# Patient Record
(demographics unavailable — no encounter records)

---

## 2023-01-01 NOTE — XR
EXAMINATION TYPE: XR hand limited bilateral

 

DATE OF EXAM: 2023

 

CLINICAL HISTORY: Abnormal physical exam on birth.

 

TECHNIQUE: Frontal and lateral images of the bilateral hands are obtained.

 

COMPARISON: None.

 

FINDINGS: Exam suboptimal due to patient age with difficulty positioning. There is flexion in the [place]
ateral hands making evaluation suboptimal. There are 5 metatarsals and proximal phalanxes identified 
bilaterally. No ossified sixth digit clearly seen. Soft tissue prominence adjacent to the distal fift
h finger correlates with patient's history without bony density at this level.

 

IMPRESSION: As above.

## 2023-01-01 NOTE — P.HPPD
History of Present Illness


H&P Date: 23


Baby Phong Ryan is a  infant born to a 23 yo  mother at 39.1 weeks 

gestation via . Antepartum complications include elevated BPs at 

160/100s upon admission, diagnosed with pre-eclampsia.


Maternal serologies: blood type O+, antibody neg, rubella immune, HepB neg, GBS 

neg, HIV neg, RPR nonreactive. GC neg, Ct neg. Infant blood type O+, RISSA neg.





Delivery:


GA: 39.1 weeks


Birth Date: 23


Birth Time: 


BW: 3090g


Length: 19 in


HC: 13 in


Fluid: clear


Apgar: 9, 9


3 vessel cord





No delivery complications.





Medications and Allergies


                                    Allergies











Allergy/AdvReac Type Severity Reaction Status Date / Time


 


No Known Allergies Allergy   Verified 23 20:44














Exam


                                   Vital Signs











  Temp Pulse Resp


 


 23 08:00  98.5 F  138  40


 


 23 05:58  98.4 F  133  38


 


 23 01:58  98.3 F  124 L  38


 


 23 21:49  98.4 F  130  36


 


 23 21:28  98.4 F  128 L  36


 


 23 20:58  98.2 F  134  40


 


 23 20:28  98.0 F  130  45


 


 23 19:58  98.5 F   50








                                Intake and Output











 23





 22:59 06:59 14:59


 


Intake Total 20 40 


 


Balance 20 40 


 


Intake:   


 


  Oral 20 40 


 


    Feeding Type 1 20 40 


 


Other:   


 


  # Voids  2 


 


  # Bowel Movements  1 


 


  Weight 3.09 kg  











General: sleeping comfortably, well appearing, in no acute distress


Head: normocephalic, anterior fontanelle soft and flat


Eyes: no discharge, + red reflex


Ears: normal pinna


Nose: patent nares


Mouth: no ulcers or lesions


Neck: good ROM, no lymphadenopathy


CV: regular rate and rhythm, no murmurs, cap refill < 2 sec


Resp: no increased work of breathing, good aeration, no retractions


Abd: soft, nondistended, + bowel sounds


G/U: B/L descended testicles


M/S: B/L polydactyly 5th digit, R digit with small nailbed


Skin: no rashes, no cyanosis


Neuro: good tone, no focal deficits





Assessment and Plan


(1) Single liveborn, born in hospital, delivered by  section


Current Visit: Yes   Status: Acute   Code(s): Z38.01 - SINGLE LIVEBORN INFANT, 

DELIVERED BY    SNOMED Code(s): 007167537


   





(2) Infant fed formula


Current Visit: Yes   Status: Acute   Code(s): ZRM6841 -    SNOMED Code(s): 

79034107


   





(3)  affected by maternal pre-eclampsia


Current Visit: Yes   Status: Acute   Code(s): P00.0 -  AFFECTED BY 

MATERNAL HYPERTENSIVE DISORDERS   SNOMED Code(s): 0265603532


   





(4) Polydactyly of both hands


Current Visit: Yes   Status: Acute   Code(s): Q69.9 - POLYDACTYLY, UNSPECIFIED  

SNOMED Code(s): 28942491


   


Plan: 


-Routine  care


-B/L hand xray

## 2023-01-01 NOTE — P.PN
Subjective


Progress Note Date: 23


Principal diagnosis: 


Delivery was 39.1 weeks gestation via , supernumerary digits ligated 


Primary is Pat 


Mother's name is Jil


The infant's name is Braden


Not Breastfeeding 








H&P Date: 23


Magno Ryan is a  infant born to a 21 yo  mother at 39.1 weeks 

gestation via . Antepartum complications include elevated BPs at 

160/100s upon admission, diagnosed with pre-eclampsia.


Maternal serologies: blood type O+, antibody neg, rubella immune, HepB neg, GBS 

neg, HIV neg, RPR nonreactive. GC neg, Ct neg. Infant blood type O+, RISSA neg.





Delivery:


GA: 39.1 weeks


Birth Date: 23


Birth Time: 


BW: 3090g


Length: 19 in


HC: 13 in


Fluid: clear


Apgar: 9, 9


3 vessel cord





No delivery complications.





Progress Note Date: 23


No acute events overnight. Feeding well, is voiding and stooling. Mother with no

infant concerns at this time. TcBili 6.1 at 24 HOL.





B/L hand xrays yesterday showed no bony prominence on B/L polydactyly. Ligated 

extra digits on B/L 5th digits with vicryl suture thread, infant tolerated 

procedure well.





Delivery was 39.1 weeks gestation via , supernumerary digits ligated 


Primary is Pat 


Mother's name is Jil


The infant's name is Braden


Not Breastfeeding 








Hospital Course





1) Resp/CV


No significant issues at present





2) Fluids/Nutrition


NOT Breastfeeding


Birthweight  3090 g (AGA), current weight 2.99 kg - late , (3.2 % negative 

weight change). 





3) 39.1 weeks gestation via , supernumerary digits ligated 


No glucose or temp instability was documented


The TcBili 9.3 @ 53 hours 


The TcBili 11.5 @ 76 hours 





4) ID


Not a current cause for concern





5) MSK


 supernumerary digits ligated 





5) Psychosocial/Disposition


Family updated at the bedside.


 - Social Work consult


Admit prolonged due to maternal factors only








Vitamin K and HBV was administered.





The f/u hearing screen passed





The CCHD passed 














Objective





- Vital Signs


Vital signs: 


                                   Vital Signs











Temp  98.7 F   23 00:00


 


Pulse  130   23 00:00


 


Resp  30   23 00:00


 


BP      


 


Pulse Ox      


 


FiO2      








                                 Intake & Output











 23





 18:59 06:59 18:59


 


Intake Total 20 170 


 


Balance 20 170 


 


Weight  2.895 kg 


 


Intake:   


 


  Oral 20 170 


 


    Feeding Type 1 20 170 


 


Other:   


 


  # Voids 1 1 


 


  # Bowel Movements 1 1 














- Exam





[place] flat, acyanotic, calvarium intact and symmetrical.





The tragus is normally formed and placed





Nares patent bilaterally





Oropharynx with palate fused midline, no significant ankylosis of lip or tongue,

no bonds nodules or Clifford's Pearls





Neck without clavicle fractures evident, thyroid masses or branchial cleft 

remnant.





Chest clear to auscultation with full expansion of the chest cavity





Cardiac S1-S2 normally split without any obvious murmurs or gallops. Distal 

pulses +2/+2





Abdomen bowel sounds present without evident distension, masses or tenderness





 rectal: External genitalia anatomy normal/not reexamined if modified by 

another provider, patent non inflamed rectum





Back and extremities without developmental hip dysplasia, full active and 

passive range of motion, no significant crepitus


Supernumerary digits with ties bilaterally





Skin without clubbing cyanosis or edema. Good Capillary refill.





Neuro no pathologic reflexes were identified








Assessment and Plan


(1) Single liveborn, born in hospital, delivered by  section


Current Visit: Yes   Status: Acute   Code(s): Z38.01 - SINGLE LIVEBORN INFANT, 

DELIVERED BY    SNOMED Code(s): 308770892


   





(2) Infant fed formula


Current Visit: Yes   Status: Acute   Code(s): UZM3057 -    SNOMED Code(s): 

31927639


   





(3) Supernumerary digits


Current Visit: Yes   Status: Acute   Code(s): Q69.9 - POLYDACTYLY, UNSPECIFIED  

SNOMED Code(s): 742582076


   





(4)  affected by maternal pre-eclampsia


Current Visit: Yes   Status: Acute   Code(s): P00.0 -  AFFECTED BY 

MATERNAL HYPERTENSIVE DISORDERS   SNOMED Code(s): 7264908753


   





(5) Needs parenting support and education


Narrative/Plan: 


social work consult


Current Visit: Yes   Status: Acute   Code(s): Z78.9 - OTHER SPECIFIED HEALTH 

STATUS   SNOMED Code(s): 676968088


   


Plan: 


As noted above





1) Anticipatory guidance discussed re: first three months of life as time 

permitted





2) Breastfeeding was encouraged if the family was receptive





3) Family encouraged to schedule a f/u visit with their primary care 

pediatrician prior to discharge





Time with Patient: Greater than 30

## 2023-01-01 NOTE — P.PN
Subjective


Progress Note Date: 23


No acute events overnight. Feeding well, is voiding and stooling. Mother with no

infant concerns at this time. TcBili 6.1 at 24 HOL.





B/L hand xrays yesterday showed no bony prominence on B/L polydactyly. Ligated 

extra digits on B/L 5th digits with vicryl suture thread, infant tolerated 

procedure well.





Objective





- Vital Signs


Vital signs: 


                                   Vital Signs











Temp  98.4 F   23 08:39


 


Pulse  160   23 08:39


 


Resp  58   23 08:39


 


BP      


 


Pulse Ox      


 


FiO2      








                                 Intake & Output











 23





 18:59 06:59 18:59


 


Intake Total 100 150 60


 


Balance 100 150 60


 


Weight  3 kg 


 


Intake:   


 


  Oral 100 150 60


 


    Feeding Type 1 100 150 60


 


Other:   


 


  # Voids 1  


 


  # Bowel Movements 1  














- Exam


General: sleeping comfortably, well appearing, in no acute distress


Head: normocephalic, anterior fontanelle soft and flat


Mouth: no ulcers or lesions


Neck: good ROM, no lymphadenopathy


CV: regular rate and rhythm, no murmurs, cap refill < 2 sec


Resp: no increased work of breathing, good aeration, no retractions


Abd: soft, nondistended, + bowel sounds


G/U: B/L descended testicles


M/S: B/L polydactyly 5th digit, R digit with small nailbed


Skin: no rashes, no cyanosis


Neuro: good tone, no focal deficits





Assessment and Plan


(1) Single liveborn, born in hospital, delivered by  section


Current Visit: Yes   Status: Acute   Code(s): Z38.01 - SINGLE LIVEBORN INFANT, 

DELIVERED BY    SNOMED Code(s): 050422053


   





(2) Infant fed formula


Current Visit: Yes   Status: Acute   Code(s): FAM2152 -    SNOMED Code(s): 

60215526


   





(3) [place] affected by maternal pre-eclampsia


Current Visit: Yes   Status: Acute   Code(s): P00.0 -  AFFECTED BY 

MATERNAL HYPERTENSIVE DISORDERS   SNOMED Code(s): 5458643444


   





(4) Polydactyly of both hands


Current Visit: Yes   Status: Acute   Code(s): Q69.9 - POLYDACTYLY, UNSPECIFIED  

SNOMED Code(s): 07626383


   


Plan: 


-Routine  care

## 2023-01-01 NOTE — P.DS
Providers


Date of admission: 


23 19:58





Attending physician: 


Arash Nash MD





Primary care physician: 











Delivery was 39.1 weeks gestation via , supernumerary digits ligated 


Primary is Pat 


Mother's name is Jil


The infant's name is Braden


Not Breastfeeding 











- Discharge Diagnosis(es)


(1) Supernumerary digits


Current Visit: Yes   Status: Acute   





(2) Infant fed formula


Current Visit: Yes   Status: Acute   





(3)  affected by maternal pre-eclampsia


Current Visit: Yes   Status: Acute   





(4) Single liveborn, born in hospital, delivered by  section


Current Visit: Yes   Status: Acute   


Hospital Course: 


H&P Date: 23


Magno Ryan is a  infant born to a 23 yo  mother at 39.1 weeks 

gestation via . Antepartum complications include elevated BPs at 

160/100s upon admission, diagnosed with pre-eclampsia.


Maternal serologies: blood type O+, antibody neg, rubella immune, HepB neg, GBS 

neg, HIV neg, RPR nonreactive. GC neg, Ct neg. Infant blood type O+, RISSA neg.





Delivery:


GA: 39.1 weeks


Birth Date: 23


Birth Time: 


BW: 3090g


Length: 19 in


HC: 13 in


Fluid: clear


Apgar: 9, 9


3 vessel cord





No delivery complications.





Progress Note Date: 23


No acute events overnight. Feeding well, is voiding and stooling. Mother with no

infant concerns at this time. TcBili 6.1 at 24 HOL.





B/L hand xrays yesterday showed no bony prominence on B/L polydactyly. Ligated 

extra digits on B/L 5th digits with vicryl suture thread, infant tolerated proce

dure well.





Delivery was 39.1 weeks gestation via , supernumerary digits ligated 


Primary is Pat 


Mother's name is Jil


The infant's name is Braden


Not Breastfeeding 








Hospital Course





1) Resp/CV


No significant issues at present





2) Fluids/Nutrition


NOT Breastfeeding


Birthweight  3090 g (AGA), current weight 2.99 kg - late , (3.2 % negative 

weight change). 





3) 39.1 weeks gestation via , supernumerary digits ligated 


No glucose or temp instability was documented


The TcBili 9.3 @ 53 hours - will f/u prior to discharge 





4) ID


Not a current cause for concern





5) MSK


 supernumerary digits ligated 





5) Psychosocial/Disposition


Family updated at the bedside.








Vitamin K and HBV was administered.





The f/u hearing screen passed





The CCHD passed 








Discharge Exam





[place] flat, acyanotic, calvarium intact and symmetrical.





The tragus is normally formed and placed





Nares patent bilaterally





Oropharynx with palate fused midline, no significant ankylosis of lip or tongue,

no bonds nodules or Clifford's Pearls





Neck without clavicle fractures evident, thyroid masses or branchial cleft 

remnant.





Chest clear to auscultation with full expansion of the chest cavity





Cardiac S1-S2 normally split without any obvious murmurs or gallops. Distal 

pulses +2/+2





Abdomen bowel sounds present without evident distension, masses or tenderness





 rectal: External genitalia anatomy normal/not reexamined if modified by 

another provider, patent non inflamed rectum





Back and extremities without developmental hip dysplasia, full active and 

passive range of motion, no significant crepitus


Supernumerary digits with ties bilaterally





Skin without clubbing cyanosis or edema. Good Capillary refill.





Neuro no pathologic reflexes were identified











Patient Condition at Discharge: Good





Plan - Discharge Summary


Follow up Appointment(s)/Referral(s): 


Pattie Stewart MD [STAFF PHYSICIAN] - 1 Week


Activity/Diet/Wound Care/Special Instructions: 


Over the next several days, the extra digits will start turning a blue/black 

color and eventually fall off the hands. Keep the hands covered at all times so 

that once extra digits fall off, infant does not swallow them.











Anticipatory Guidance re: newborns


The following is general advice and guidance about issues that only COULD 

develop in the first few months of life - there is of course significant va

riability from one infant to another





Vision:


Initial vision is limited to shapes, lights and dark for the first few days


Initial color vision is primarily red and yellow - it is an exciting time as y

our infant will suddenly recognize new colors suddenly


Initial toys should have bright colors and sharp contrasts


Fixing and following moving objects takes about 2-3 months





Hearing


Infants tend to hear very well and may recognize voices and noises around Mom 

when she was pregnant


You baby is not going home - she/he is going back home


Low tones are usually recognized first - so dad's voice may be recognizable 

first for a few days





Mouth and Nose:


Infants spend a lot of time eating and their bodies are structured accordingly


Infants do not breath well through their mouth so keeping their nasal passages 

open is important


Infants normally do a LITTLE choking initially and potentially a lot of reflux 

(spitting)


Most infants are "happy spitters"  - but even a little bit of reflux IN SOME 

INFANTS can cause significant issues - this needs to be sorted out with your 

primary care pediatrician, usually it is ok to give her/him 5 days to sort it 

out





Chest:


If the lungs are going to be "a problem" - it happens very quickly after birth


The chest cavity has significant fluid shifts. This is the source of most 

temporary heart murmurs (extra heart noises).


INSIDE MOM: The INFANT'S lungs are full of fluid at birth and blood is shunted 

away from the lungs.


AFTER BIRTH: the infant's lungs are full of air and blood is shunted to the 

lung.


This is good news for us because the baby is born slightly overhydrated and we 

can relax a little with the initial feedings





The Diaper


The diaper is white and a small amount of blood on a white diaper looks like 

more than it is.


There are many reasons for blood in the diaper (or things that look like blood 

in the diaper). It is unusual for this to be a cause for concern.


New urine very occasionally can be a red-brown color initially instead of yellow

and is described as "brick dust" that can look like dried blood - it is not.


The initially stools (poop) can produce a tiny tear in the rectum (like a paper 

cut) and can be treated with diaper medication (A+D or Desitin) and heals well.


If you choose to have a circumcision done,  it can ooze for a few days after it 

is performed. GENEROUS application of vaseline (A+D ointment etc) is recommended

for 5 days for healing and the infant's comfort.


A female infant can have a "period" after birth  - will discuss why in a moment.

It is usually "snot" in texture but can be bloody and again is ussually of no 

concern.


The umbilical stump often dries up quickly but sometimes can drain quite a bit 

of a variety of colored fluid





The Liver


Inside Mom blood flow from Mom through the liver on it's way to the baby's heart

(The "indoor/entrance").


After birth the blood supply to the liver changes when the umbilical cord is 

cut. There are two primary issues.


1) Bilirubin


Bilirubin is a normal product of red blood cell breakdown and is a component of 

bile salts (digestive enzymes). The change in blood supply to the liver changes 

how it is processed and circulated.


Why this matters to you is that bilirubin can build up causing sedation and poor

feeding in a . This is check prior to discharge and if needed 

Phototherapy can be started. Phototherapy changes bilirubin to a form the kidney

can excrete which bypasses the liver and usually "jump starts" the system.


2) Maternal Hormones


These can accumulate and cause a variety of POSSIBLE AND TEMPORARY changes that 

can peak as late as 6-8 weeks


Rashes: Baby acne, Milia ("milk bumps") and erythema toxicum (impressive red 

streaks  - sometimes with a bump or vesicle in the middle)


TRANSIENT breast development (even in a male infant).


The "Period" mentioned above - vaginal drainage that can be clear of bloody - 

but usually white


Irritability or fussiness that can coincide with transient post-partum blues in 

Mom. Usually your baby's temperament/personalty is not really certain until at 

least 3 months - so be patient with her/him.





Feeding


I want you to do everything I can to help you successfully breastfeed your baby 

if you choose to. The initial breast milk is very special - even if there is not

very much of it. There is too much to say on this matter to go into here. It 

usually is usually not difficult, but sometimes you may need a little help.





Muscles and Bones


The clavicles (collar bones) rarely are - but can be  - cracked during the 

delivery and "heal by exuberance" - a largish lump that will completely 

disappear with time.


There can be positioning of the feet inside Mom that makes them appear abnormal 

to families - it is almost always normal.


The joints are normally lax/loose after birth and can make noise when you care 

for you baby.


The hips require your attention. The leg (femur) and hip bone (pelvis) need to 

be in contact with each other to form correctly. If you hear a consistent noise 

(clunk or chunk or other noise) inform your primary care physician the next 

business day.


Many of the other appearances of the bones that look abnormal to you resolve 

with time - again your primary care pediatrician can follow that and advise you.





Head:


There can be molding (temporary head shape change). This only takes days to go 

away


There is a "soft spot" in the front of the head that you DO NOT have to exercise

excess caution touching








More about The Skin


Two simple caveats:


1) You may get a lot of advice about bathing your baby. The only real 

significant concern is when bathing your baby try to keep  soap out of her/his 

eyes. Tear ducts and tear production is limited in some babies for up to 9 

months.


2) Moisturizing your baby is good - but the scalp does not need a lot of 

moisturizing.


In fact there is a rash on the scalp called "cradle cap" later on in the first 

few months occasionally. It is USUALLY oily skin that looks like dry skin. 

Nothing really needs to be done BUT most parents are not pleased with the 

appearance. Gentle soap and a soft brush is great. If it particularly 

significant a TINY amount of dandruff shampoo and a brush.





Sleep


Sleep varies a lot from one baby to another. Newborns can sleep up to 20-22 

hours a day for a few weeks. Later, the old rule of thumb for sleep is "sleeping

through the night" is 6 continuous hours at about 6 weeks sometime during the 

day.





Growth


Steady growth is expected at first. As your baby gets older (for most children) 

most growth becomes less linear and usually occurs in "spurts"





In conclusion


Most importantly, although the first few months of life can be hard work - it is

supposed to be fun. If it isn't fun maybe there is something wrong - reach out 

to your primary care doctor. It is easier to fix problems when they are small 

problems.


Try to call your doctor before taking your baby to the ER if you can.





Discharge Disposition: HOME SELF-CARE


Plan of Treatment: 


As noted above





1) Anticipatory guidance discussed re: first three months of life as time 

permitted





2) Breastfeeding was encouraged if the family was receptive





3) Family encouraged to schedule a f/u visit with their primary care 

pediatrician prior to discharge

## 2023-01-01 NOTE — P.PCN
Date of Procedure: 23


Preoperative Diagnosis: 


1. uncircumcised male 


Postoperative Diagnosis: 


uncircumcised male 


Procedure(s) Performed: 


Elective  circumcision


Anesthesia: local


Surgeon: Margaret Good


Estimated Blood Loss (ml): 1


Pathology: none sent


Condition: stable


Disposition: floor


Description of Procedure: 


Signed consent reviewed with the nurse.  Betadine prepped area.  0.9 mL of 1% 

lidocaine injected for penile block.  1.3 Gomco used to perform circumcision.  

No abnormalities or complications.